# Patient Record
Sex: MALE | Race: WHITE | Employment: UNEMPLOYED | URBAN - METROPOLITAN AREA
[De-identification: names, ages, dates, MRNs, and addresses within clinical notes are randomized per-mention and may not be internally consistent; named-entity substitution may affect disease eponyms.]

---

## 2019-08-03 ENCOUNTER — HOSPITAL ENCOUNTER (EMERGENCY)
Facility: CLINIC | Age: 34
Discharge: HOME | End: 2019-08-03
Attending: EMERGENCY MEDICINE | Admitting: EMERGENCY MEDICINE
Payer: COMMERCIAL

## 2019-08-03 VITALS
DIASTOLIC BLOOD PRESSURE: 76 MMHG | SYSTOLIC BLOOD PRESSURE: 119 MMHG | OXYGEN SATURATION: 98 % | HEART RATE: 63 BPM | HEIGHT: 71 IN | TEMPERATURE: 98.4 F | RESPIRATION RATE: 18 BRPM

## 2019-08-03 DIAGNOSIS — K08.89 PAIN, DENTAL: ICD-10-CM

## 2019-08-03 PROCEDURE — 99283 EMERGENCY DEPT VISIT LOW MDM: CPT | Mod: Z6 | Performed by: EMERGENCY MEDICINE

## 2019-08-03 PROCEDURE — 99282 EMERGENCY DEPT VISIT SF MDM: CPT | Performed by: EMERGENCY MEDICINE

## 2019-08-03 RX ORDER — AMOXICILLIN 500 MG/1
500 CAPSULE ORAL 3 TIMES DAILY
Qty: 21 CAPSULE | Refills: 0 | Status: SHIPPED | OUTPATIENT
Start: 2019-08-03 | End: 2019-08-10

## 2019-08-03 RX ORDER — ACETAMINOPHEN AND CODEINE PHOSPHATE 300; 30 MG/1; MG/1
1-2 TABLET ORAL EVERY 4 HOURS PRN
Qty: 12 TABLET | Refills: 0 | Status: SHIPPED | OUTPATIENT
Start: 2019-08-03

## 2019-08-03 RX ORDER — ACETAMINOPHEN AND CODEINE PHOSPHATE 300; 30 MG/1; MG/1
1-2 TABLET ORAL EVERY 4 HOURS PRN
Qty: 10 TABLET | Refills: 0 | Status: SHIPPED | OUTPATIENT
Start: 2019-08-03

## 2019-08-03 NOTE — ED AVS SNAPSHOT
[facility], [place], Emergency Department  [address] [place] [id number]  Phone:  [phone]                                    Ric Franks   MRN: [id number]    Department:  [facility], Emergency Department   Date of Visit:  8/3/2019           After Visit Summary Signature Page    I have received my discharge instructions, and my questions have been answered. I have discussed any challenges I see with this plan with the nurse or doctor.    ..........................................................................................................................................  Patient/Patient Representative Signature      ..........................................................................................................................................  Patient Representative Print Name and Relationship to Patient    ..................................................               ................................................  Date                                   Time    ..........................................................................................................................................  Reviewed by Signature/Title    ...................................................              ..............................................  Date                                               Time          22EPIC Rev 08/18

## 2019-08-04 NOTE — DISCHARGE INSTRUCTIONS
Why We Don't Prescribe Opioids and Benzodiazepines Together  Helping You Take Your Medicines Safely  What are the dangers of mixing opioids and benzodiazepines (BZDs)?  It's very risky to mix opioids and benzodiazepines. Both medicines can make your brain work slower. The risks of taking these medicines together include:    Feeling overly sleepy or drowsy    Getting hurt by accident    Slowed breathing or trouble breathing    Coma    Accidental overdose    Death  If you are taking both these medicines, your provider will safely taper you off one of them. We will help you find other, safer medicines.  How can I make taking these drugs safer?    Don't drink alcohol while taking these medicines. Wine, beer and liquor can raise your risk of deadly side effects.    Don't drive or use heavy machinery until you know how these medicines affect you.    Tell your healthcare providers about all the medicines you're taking. Include over-the-counter (OTC) medicines, like cold and allergy pills.    Keep a list of all your medicines where you can find it quickly.  What are opioids?  Opioids (GF-ixd-yhlhv) are powerful medicines for pain. They can cause problems even when you use them right. Using opioids also increases your risk of injury, addiction, overdose and death.   Examples of opioids    Hydrocodone (Vicodin, Norco, Lortab)    Oxycodone (Percocet)    Morphine    Fentanyl    Methadone    Tramadol    Buprenorphine  Common side effects of opioids    Feeling drowsy or dizzy    Upset stomach (nausea)    Throwing up (vomiting)    Hard stools (constipation)    Mood problems    Slowed breathing or trouble breathing  What are benzodiazepines?  Benzodiazepines (xander-zoh-die-AZ-uh-peenz), or BZDs, are used to treat seizures, muscle spasm, anxiety and sleeplessness (insomnia). BZDs can cause many problems, including drug abuse.   Examples of benzodiazepines    Alprazolam (Xanax)    Clonazepam (Klonopin)    Diazepam  "(Valium)    Lorazepam (Ativan)    Temazepam (Restoril)    Zolpidem (Ambien)  Common side effects of benzodiazepines    Feeling drowsy or dizzy    Weakness    Trouble thinking    Mood problems  Signs of an overdose  Call 911 right away for any of these symptoms:    Face is very pale or feels clammy    Body is limp    Fingernails or lips look blue or purple    Throwing up or making gurgling noises    Won't wake up    Can't talk    Breathing or heartbeat is slow or stopped   What can I do to prevent an overdose?  1. Only take medicine prescribed to you by your doctor.  2. Take your medicine exactly as prescribed. Don't take more medicine, and don't take it more often than your doctor said to.  3. NEVER mix pain medicines with alcohol, sleeping pills or other drugs.  4. Store medicines in a safe place where children and pets can't reach them.  5. Ask your pharmacist the right way to get rid of unused medicines.  6. Learn about the signs and symptoms of overdose. Overdose is a life-or-death emergency.  7. Ask your provider about using naloxone.  What is naloxone and how can it help me?  Naloxone (nuh-LOX-ohn) is a very important medicine that can make it safer to use opioids.   Naloxone can reverse the effects of an opioid overdose. But you need to take it the right way at the right time.  Naloxone won't treat benzodiazepine overdose. But naloxone can help if opioids were taken together with BZDs, sleeping pills, or stimulants (\"uppers\").  Ask your provider about naloxone if you are taking opioids.     For informational purposes only. Not to replace the advice of your health care provider. Copyright   2017 [employer]. All rights reserved. [employer] 376493 - 03/17.  Please follow-up with dentist for definitive management of your fractured tooth.  Many of these clinics offer a sliding fee option for patients that qualify, and see patients on a walk-in or same day basis. Please call each clinic " directly. As services, hours, fees and policies vary greatly.    [place]:  [facility]     [phone]  [facility] ([facility]) [phone]  [facility]  [phone]  [facility]      [phone]   [facility]    [phone]  [facility]  [phone]  [facility] [phone]  [facility]     [phone]  [facility]   [phone]  [facility] (cash only)   [phone]  [facility]    [phone] (adults)          [phone] (children)    [place]:  [facility]     [phone]; [phone]  [facility]     [phone]  [facility]     [phone]  [facility] (free, limited)    [phone]    Multiple Locations:  [facility]       [phone]

## 2019-08-04 NOTE — ED PROVIDER NOTES
"      [place] EMERGENCY DEPARTMENT ([facility])  August 3, 2019    History     Chief Complaint   Patient presents with     Dental Pain     The history is provided by the patient and medical records.     Ric Franks is an otherwise healthy 34 year old male who presents to the Emergency Department today for evaluation of dental pain. Patient reports he broke his tooth last week while eating trail mix. Patient has not yet scheduled an appointment with his dentist. He has been taking ibuprofen to control the pain.     I have reviewed the Medications, Allergies, Past Medical and Surgical History, and Social History in the Epic system.    History reviewed. No pertinent past medical history.    History reviewed. No pertinent surgical history.    History reviewed. No pertinent family history.    Social History     Tobacco Use     Smoking status: Current Every Day Smoker     Types: Cigarettes   Substance Use Topics     Alcohol use: Yes       No current facility-administered medications for this encounter.      Current Outpatient Medications   Medication     acetaminophen-codeine (TYLENOL WITH CODEINE #3) 300-30 MG per tablet     acetaminophen-codeine (TYLENOL WITH CODEINE #3) 300-30 MG per tablet     amoxicillin (AMOXIL) 500 MG capsule      No Known Allergies    Review of Systems   HENT: Positive for dental problem.    All other systems reviewed and are negative.      Physical Exam   BP: 119/76  Pulse: 63  Temp: 98.4  F (36.9  C)  Resp: 18  Height: 180.3 cm (5' 11\")  SpO2: 98 %      Physical Exam   Constitutional: He appears well-developed. No distress.   HENT:   Head: Normocephalic.   Left upper tooth dental fracture with tenderness.  No obvious abscess or drainage.   Eyes: EOM are normal. No scleral icterus.   Neck: Neck supple.   Pulmonary/Chest: No respiratory distress.   Musculoskeletal: He exhibits no deformity.   Neurological: He is alert.   Skin: Skin is dry.   Nursing note and vitals reviewed.      ED " Course   9:18 PM  The patient was seen and examined by José Manuel Chacon DO, in Room VTA.        Procedures           Labs Ordered and Resulted from Time of ED Arrival Up to the Time of Departure from the ED - No data to display      No results found for this or any previous visit (from the past 24 hour(s)).      Assessments & Plan (with Medical Decision Making)   34-year-old male presents to us with a chief complaint of dental pain.  He fractured this tooth approximately a week ago.  Pain is just started up.  I suspect he is developing infection.  We will give him a short course of Tylenol with codeine as well as amoxicillin and recommended he follow-up with dental for definitive management.  Patient is comfortable with discharge home and the plan    I have reviewed the nursing notes.    I have reviewed the findings, diagnosis, plan and need for follow up with the patient.       Medication List      Started    * acetaminophen-codeine 300-30 MG per tablet  Commonly known as:  TYLENOL WITH CODEINE #3  1-2 tablets, Oral, EVERY 4 HOURS PRN     * acetaminophen-codeine 300-30 MG per tablet  Commonly known as:  TYLENOL WITH CODEINE #3  1-2 tablets, Oral, EVERY 4 HOURS PRN     amoxicillin 500 MG capsule  Commonly known as:  AMOXIL  500 mg, Oral, 3 TIMES DAILY         * This list has 2 medication(s) that are the same as other medications prescribed for you. Read the directions carefully, and ask your doctor or other care provider to review them with you.                Final diagnoses:   Pain, dental     I, Yusef Merino, am serving as a trained medical scribe to document services personally performed by José Manuel Chacon DO, based on the provider's statements to me.      I, José Manuel Chacon DO, was physically present and have reviewed and verified the accuracy of this note documented by Yusef Merino.     8/3/2019   [facility], [place], EMERGENCY DEPARTMENT     José Manuel Chacon DO  08/03/19 1975

## 2019-08-04 NOTE — ED TRIAGE NOTES
Pt presents to the ER with complaints of upper left sided dental pain. Pt states that he fracture a tooth 1 week ago and has since not been able to eat or drink due to pain. Pt states he's been taking two times the recommended amount of  OTC ibuprofen and tylenol with no relief.